# Patient Record
Sex: MALE | Race: WHITE | NOT HISPANIC OR LATINO | URBAN - METROPOLITAN AREA
[De-identification: names, ages, dates, MRNs, and addresses within clinical notes are randomized per-mention and may not be internally consistent; named-entity substitution may affect disease eponyms.]

---

## 2023-03-27 ENCOUNTER — NURSING HOME VISIT (OUTPATIENT)
Dept: POST ACUTE CARE | Facility: EXTERNAL LOCATION | Age: 88
End: 2023-03-27
Payer: MEDICARE

## 2023-03-27 DIAGNOSIS — N18.31 STAGE 3A CHRONIC KIDNEY DISEASE (MULTI): ICD-10-CM

## 2023-03-27 DIAGNOSIS — J18.9 PNEUMONIA OF LEFT LOWER LOBE DUE TO INFECTIOUS ORGANISM: Primary | ICD-10-CM

## 2023-03-27 DIAGNOSIS — I50.43 ACUTE ON CHRONIC COMBINED SYSTOLIC AND DIASTOLIC CONGESTIVE HEART FAILURE (MULTI): ICD-10-CM

## 2023-03-27 PROCEDURE — 99309 SBSQ NF CARE MODERATE MDM 30: CPT | Performed by: NURSE PRACTITIONER

## 2023-03-27 NOTE — LETTER
Patient: Peterson Argueta  : 1930    Encounter Date: 2023    Subjective  Patient ID: Peterson Argueta is a 89 y.o. male who presents for No chief complaint on file..  89 yo male admitted to [facility] from the hospital with weakness, recent fall, CHF, pneumonia, CKD, HTN, hyperlipidemia.          Review of Systems   Constitutional:  Positive for fatigue. Negative for chills and fever.   Respiratory:  Positive for cough, shortness of breath and wheezing.    Cardiovascular:  Positive for leg swelling. Negative for chest pain and palpitations.   Gastrointestinal:  Positive for nausea. Negative for abdominal pain, blood in stool, constipation, diarrhea and vomiting.        Denies heartburn   Neurological:  Positive for weakness. Negative for light-headedness.       Objective  Physical Exam  Constitutional:       Appearance: He is ill-appearing.   Cardiovascular:      Rate and Rhythm: Rhythm irregular.   Pulmonary:      Breath sounds: Wheezing and rhonchi present.      Comments: Resident reports sob on minimal activity.  Denies ever smoking.    Abdominal:      General: Bowel sounds are normal.   Musculoskeletal:      Right lower leg: Edema present.      Left lower leg: Edema present.   Neurological:      Mental Status: He is alert.   Psychiatric:         Mood and Affect: Mood normal.         No current outpatient medications on file.     Assessment/Plan  Problem List Items Addressed This Visit          Respiratory    Pneumonia of left lower lobe due to infectious organism - Primary       Circulatory    Acute on chronic combined systolic and diastolic congestive heart failure (CMS/HCC)       Genitourinary    Stage 3a chronic kidney disease   Resident will obtain labs tomorrow.  Pulse ox between 89-91% on room air at rest.  Will start him on 02 2L NC.  He is currently taking Guaifenesin prn only.  Will have him take Guaifenesin 600mg one tablet daily.  He is getting Duoneb q4hrs and discharged from the hospital with  Prednisone order.  Will get CXR today.  Continue to monitor.  PT/OT and dietician to eval.             Electronically Signed By: SANTA Cope   3/28/23 12:33 PM

## 2023-03-28 ENCOUNTER — NURSING HOME VISIT (OUTPATIENT)
Dept: POST ACUTE CARE | Facility: EXTERNAL LOCATION | Age: 88
End: 2023-03-28
Payer: MEDICARE

## 2023-03-28 DIAGNOSIS — N18.31 STAGE 3A CHRONIC KIDNEY DISEASE (MULTI): ICD-10-CM

## 2023-03-28 DIAGNOSIS — J18.9 PNEUMONIA OF RIGHT LOWER LOBE DUE TO INFECTIOUS ORGANISM: Primary | ICD-10-CM

## 2023-03-28 PROBLEM — I10 ESSENTIAL HYPERTENSION: Status: ACTIVE | Noted: 2023-03-28

## 2023-03-28 PROBLEM — I50.43 ACUTE ON CHRONIC COMBINED SYSTOLIC AND DIASTOLIC CONGESTIVE HEART FAILURE (MULTI): Status: ACTIVE | Noted: 2023-03-28

## 2023-03-28 PROBLEM — I48.21 ATRIAL FIBRILLATION, PERMANENT (MULTI): Status: ACTIVE | Noted: 2023-03-28

## 2023-03-28 PROBLEM — E78.5 HYPERLIPIDEMIA: Status: ACTIVE | Noted: 2023-03-28

## 2023-03-28 PROCEDURE — 99309 SBSQ NF CARE MODERATE MDM 30: CPT | Performed by: NURSE PRACTITIONER

## 2023-03-28 ASSESSMENT — ENCOUNTER SYMPTOMS
DIARRHEA: 0
PALPITATIONS: 0
SHORTNESS OF BREATH: 1
ABDOMINAL PAIN: 0
LIGHT-HEADEDNESS: 0
WEAKNESS: 1
ROS GI COMMENTS: DENIES HEARTBURN
WHEEZING: 1
PALPITATIONS: 0
GASTROINTESTINAL NEGATIVE: 1
SHORTNESS OF BREATH: 1
FEVER: 0
VOMITING: 0
COUGH: 1
WHEEZING: 1
NAUSEA: 1
BLOOD IN STOOL: 0
FATIGUE: 1
CHILLS: 0
CONSTIPATION: 0

## 2023-03-28 NOTE — PROGRESS NOTES
Subjective   Patient ID: Peterson Argueta is a 89 y.o. male who presents for No chief complaint on file..  89 yo male admitted to [facility] from the hospital with weakness, recent fall, CHF, pneumonia, CKD, HTN, hyperlipidemia.          Review of Systems   Constitutional:  Positive for fatigue. Negative for chills and fever.   Respiratory:  Positive for cough, shortness of breath and wheezing.    Cardiovascular:  Positive for leg swelling. Negative for chest pain and palpitations.   Gastrointestinal:  Positive for nausea. Negative for abdominal pain, blood in stool, constipation, diarrhea and vomiting.        Denies heartburn   Neurological:  Positive for weakness. Negative for light-headedness.       Objective   Physical Exam  Constitutional:       Appearance: He is ill-appearing.   Cardiovascular:      Rate and Rhythm: Rhythm irregular.   Pulmonary:      Breath sounds: Wheezing and rhonchi present.      Comments: Resident reports sob on minimal activity.  Denies ever smoking.    Abdominal:      General: Bowel sounds are normal.   Musculoskeletal:      Right lower leg: Edema present.      Left lower leg: Edema present.   Neurological:      Mental Status: He is alert.   Psychiatric:         Mood and Affect: Mood normal.         No current outpatient medications on file.     Assessment/Plan   Problem List Items Addressed This Visit          Respiratory    Pneumonia of left lower lobe due to infectious organism - Primary       Circulatory    Acute on chronic combined systolic and diastolic congestive heart failure (CMS/HCC)       Genitourinary    Stage 3a chronic kidney disease   Resident will obtain labs tomorrow.  Pulse ox between 89-91% on room air at rest.  Will start him on 02 2L NC.  He is currently taking Guaifenesin prn only.  Will have him take Guaifenesin 600mg one tablet daily.  He is getting Duoneb q4hrs and discharged from the hospital with Prednisone order.  Will get CXR today.  Continue to monitor.  PT/OT and  dietician to syed

## 2023-03-28 NOTE — PROGRESS NOTES
Subjective   Patient ID: Peterson Argueta is a 89 y.o. male who presents for No chief complaint on file..  89 yo male recent admission to [facility] with history of CHF, CKD, pneumonia and sleep apnea untreated.  Resident states today he is breathing somewhat better with the 02 but not much improvement.  Started on Doxycycline yesterday.  Continues to get Duoneb q4hrs.  Resident is not aware if he has ever been anemic and denies ever seeing blood in his stool.  Labs reviewed.          Review of Systems   Respiratory:  Positive for shortness of breath and wheezing.    Cardiovascular:  Positive for leg swelling. Negative for chest pain and palpitations.   Gastrointestinal: Negative.        Objective   Physical Exam  Constitutional:       Appearance: He is ill-appearing.   Cardiovascular:      Rate and Rhythm: Rhythm irregular.   Pulmonary:      Breath sounds: Wheezing present.   Abdominal:      General: Bowel sounds are normal.   Musculoskeletal:      Right lower leg: Edema present.      Left lower leg: Edema present.         No current outpatient medications on file.     Assessment/Plan   Problem List Items Addressed This Visit          Respiratory    Pneumonia of right lower lobe due to infectious organism - Primary       Genitourinary    Stage 3a chronic kidney disease   Resident was consulted while in the hospital by Dr. Greco nephrology.  Discussed kidney functions with Renetta HOSKINS from Dr. Castanon office.  Reviewed CXR and labs.    Will get additional labs and occult stool tomorrow.  Resident to be placed on a restricted fluid 1500ml per day.  Azael hose for edema.  Continue to monitor.

## 2023-03-29 ENCOUNTER — NURSING HOME VISIT (OUTPATIENT)
Dept: POST ACUTE CARE | Facility: EXTERNAL LOCATION | Age: 88
End: 2023-03-29
Payer: MEDICARE

## 2023-03-29 DIAGNOSIS — J18.9 PNEUMONIA OF RIGHT LOWER LOBE DUE TO INFECTIOUS ORGANISM: ICD-10-CM

## 2023-03-29 DIAGNOSIS — J18.9 PNEUMONIA OF LEFT LOWER LOBE DUE TO INFECTIOUS ORGANISM: Primary | ICD-10-CM

## 2023-03-29 PROCEDURE — 99309 SBSQ NF CARE MODERATE MDM 30: CPT | Performed by: NURSE PRACTITIONER

## 2023-03-29 NOTE — LETTER
Patient: Peterson Argueta  : 1930    Encounter Date: 2023    Subjective  Patient ID: Peterson Argueta is a 89 y.o. male who presents for No chief complaint on file..  89 yo male with history of pneumonia.  States he is feeling better today with less sob.          Review of Systems   Constitutional:  Negative for fever.   Respiratory:  Positive for cough, shortness of breath and wheezing.         Feels he cannot get phlegm up.   Cardiovascular: Negative.    Gastrointestinal: Negative.        Objective  Physical Exam  Constitutional:       Appearance: He is ill-appearing.   Cardiovascular:      Rate and Rhythm: Regular rhythm.   Pulmonary:      Breath sounds: Wheezing and rhonchi present.   Abdominal:      General: Bowel sounds are normal.   Musculoskeletal:      Right lower leg: Edema present.      Left lower leg: Edema present.         No current outpatient medications on file.     Assessment/Plan  Problem List Items Addressed This Visit          Respiratory    Pneumonia of left lower lobe due to infectious organism - Primary    Pneumonia of right lower lobe due to infectious organism     Will continue resident on Doxycycline 100mg bid.  Increase his Guaifenesin to 600mg bid.  Start him on Tessalon Perles at hs only as the cough is keeping him up at night.  Continue on 02 2L NC.  Will get sputum culture and resident to use Incentive Spirometry qid.  Continue to monitor.           Electronically Signed By: SANTA Cope   3/30/23 12:36 PM

## 2023-03-30 ENCOUNTER — NURSING HOME VISIT (OUTPATIENT)
Dept: POST ACUTE CARE | Facility: EXTERNAL LOCATION | Age: 88
End: 2023-03-30
Payer: MEDICARE

## 2023-03-30 DIAGNOSIS — J18.9 PNEUMONIA OF RIGHT LOWER LOBE DUE TO INFECTIOUS ORGANISM: ICD-10-CM

## 2023-03-30 DIAGNOSIS — J18.9 PNEUMONIA OF LEFT LOWER LOBE DUE TO INFECTIOUS ORGANISM: Primary | ICD-10-CM

## 2023-03-30 DIAGNOSIS — H61.23 BILATERAL IMPACTED CERUMEN: ICD-10-CM

## 2023-03-30 PROCEDURE — 99309 SBSQ NF CARE MODERATE MDM 30: CPT | Performed by: NURSE PRACTITIONER

## 2023-03-30 ASSESSMENT — ENCOUNTER SYMPTOMS
FEVER: 0
COUGH: 1
SHORTNESS OF BREATH: 1
GASTROINTESTINAL NEGATIVE: 1
GASTROINTESTINAL NEGATIVE: 1
WHEEZING: 1
PALPITATIONS: 0
COUGH: 1
FEVER: 0
WHEEZING: 1
SHORTNESS OF BREATH: 1
CARDIOVASCULAR NEGATIVE: 1

## 2023-03-30 NOTE — LETTER
Patient: Peterson Argueta  : 1930    Encounter Date: 2023    Subjective  Patient ID: Peterson Argueta is a 89 y.o. male who presents for No chief complaint on file..  Follow up on 89 yo male with history of pneumonia, CVA, A Fib, CHF, HTN, hyperlipidemia.  Resident states his sob is improved however, he still cannot cough up any phlegm.  He just received his incentive spirometry today.  Continues on 02 2L NC.  Family is concerned with his increased [place].          Review of Systems   Constitutional:  Negative for fever.   HENT:  Negative for ear discharge and ear pain.    Respiratory:  Positive for cough, shortness of breath and wheezing.    Cardiovascular:  Positive for leg swelling. Negative for chest pain and palpitations.   Gastrointestinal: Negative.        Objective  Physical Exam  Constitutional:       Appearance: He is ill-appearing.   HENT:      Right Ear: There is impacted cerumen.      Left Ear: There is impacted cerumen.   Cardiovascular:      Rate and Rhythm: Rhythm irregular.   Pulmonary:      Effort: Pulmonary effort is normal.      Breath sounds: Wheezing and rhonchi present.   Abdominal:      General: Bowel sounds are normal.   Musculoskeletal:      Right lower leg: Edema present.      Left lower leg: Edema present.   Neurological:      Mental Status: He is alert.         No current outpatient medications on file.     Assessment/Plan  Problem List Items Addressed This Visit          Respiratory    Pneumonia of left lower lobe due to infectious organism - Primary    Pneumonia of right lower lobe due to infectious organism       Other    Bilateral impacted cerumen     Continue same regimen.  Get Azael hose on lower extremities.    Start Debrox for cerumen impaction, 5 gtts tid x 5 days.  Will assess need for irrigation after treatment.           Electronically Signed By: DANIELA Coep-CNP   3/30/23  1:17 PM

## 2023-03-30 NOTE — PROGRESS NOTES
Subjective   Patient ID: Peterson Argueta is a 89 y.o. male who presents for No chief complaint on file..  Follow up on 89 yo male with history of pneumonia, CVA, A Fib, CHF, HTN, hyperlipidemia.  Resident states his sob is improved however, he still cannot cough up any phlegm.  He just received his incentive spirometry today.  Continues on 02 2L NC.  Family is concerned with his increased [place].          Review of Systems   Constitutional:  Negative for fever.   HENT:  Negative for ear discharge and ear pain.    Respiratory:  Positive for cough, shortness of breath and wheezing.    Cardiovascular:  Positive for leg swelling. Negative for chest pain and palpitations.   Gastrointestinal: Negative.        Objective   Physical Exam  Constitutional:       Appearance: He is ill-appearing.   HENT:      Right Ear: There is impacted cerumen.      Left Ear: There is impacted cerumen.   Cardiovascular:      Rate and Rhythm: Rhythm irregular.   Pulmonary:      Effort: Pulmonary effort is normal.      Breath sounds: Wheezing and rhonchi present.   Abdominal:      General: Bowel sounds are normal.   Musculoskeletal:      Right lower leg: Edema present.      Left lower leg: Edema present.   Neurological:      Mental Status: He is alert.         No current outpatient medications on file.     Assessment/Plan   Problem List Items Addressed This Visit          Respiratory    Pneumonia of left lower lobe due to infectious organism - Primary    Pneumonia of right lower lobe due to infectious organism       Other    Bilateral impacted cerumen     Continue same regimen.  Get Azael hose on lower extremities.    Start Debrox for cerumen impaction, 5 gtts tid x 5 days.  Will assess need for irrigation after treatment.

## 2023-03-30 NOTE — PROGRESS NOTES
Subjective   Patient ID: Peterson Argueta is a 89 y.o. male who presents for No chief complaint on file..  89 yo male with history of pneumonia.  States he is feeling better today with less sob.          Review of Systems   Constitutional:  Negative for fever.   Respiratory:  Positive for cough, shortness of breath and wheezing.         Feels he cannot get phlegm up.   Cardiovascular: Negative.    Gastrointestinal: Negative.        Objective   Physical Exam  Constitutional:       Appearance: He is ill-appearing.   Cardiovascular:      Rate and Rhythm: Regular rhythm.   Pulmonary:      Breath sounds: Wheezing and rhonchi present.   Abdominal:      General: Bowel sounds are normal.   Musculoskeletal:      Right lower leg: Edema present.      Left lower leg: Edema present.         No current outpatient medications on file.     Assessment/Plan   Problem List Items Addressed This Visit          Respiratory    Pneumonia of left lower lobe due to infectious organism - Primary    Pneumonia of right lower lobe due to infectious organism     Will continue resident on Doxycycline 100mg bid.  Increase his Guaifenesin to 600mg bid.  Start him on Tessalon Perles at hs only as the cough is keeping him up at night.  Continue on 02 2L NC.  Will get sputum culture and resident to use Incentive Spirometry qid.  Continue to monitor.

## 2023-03-31 ENCOUNTER — NURSING HOME VISIT (OUTPATIENT)
Dept: POST ACUTE CARE | Facility: EXTERNAL LOCATION | Age: 88
End: 2023-03-31
Payer: MEDICARE

## 2023-03-31 DIAGNOSIS — I50.42 CHRONIC COMBINED SYSTOLIC AND DIASTOLIC CONGESTIVE HEART FAILURE (MULTI): ICD-10-CM

## 2023-03-31 DIAGNOSIS — I10 ESSENTIAL HYPERTENSION: ICD-10-CM

## 2023-03-31 DIAGNOSIS — N18.31 STAGE 3A CHRONIC KIDNEY DISEASE (MULTI): ICD-10-CM

## 2023-03-31 DIAGNOSIS — R60.0 BILATERAL LOWER EXTREMITY EDEMA: Primary | ICD-10-CM

## 2023-03-31 PROCEDURE — 99304 1ST NF CARE SF/LOW MDM 25: CPT | Performed by: INTERNAL MEDICINE

## 2023-03-31 NOTE — LETTER
Patient: Peterson Argueta  : 1930    Encounter Date: 2023    89 YO MALE WITH PMH HTN CHF CKD A FIB HERE FOR REHAB FEELS FINE TDAY  Pt is doing fine , no complaint  GA: Comfortable, no distress  ROS: No SOB  Medications reviewed  Head: Normal  Neck: Soft  Heart: Regular  Lungs: Clear  Abdomen: soft  EXT MILD TO MODERATE LE EDEMA    Impression: clinically doing fine, continue current management    Problem List Items Addressed This Visit          Circulatory    Essential hypertension       Genitourinary    Stage 3a chronic kidney disease       Musculoskeletal    Bilateral lower extremity edema - Primary     Other Visit Diagnoses       Chronic combined systolic and diastolic congestive heart failure (CMS/HCC)                   Electronically Signed By: Eleazar Perkins MD   3/31/23  7:00 PM

## 2023-03-31 NOTE — PROGRESS NOTES
89 YO MALE WITH PMH HTN CHF CKD A FIB HERE FOR REHAB FEELS FINE TDAY  Pt is doing fine , no complaint  GA: Comfortable, no distress  ROS: No SOB  Medications reviewed  Head: Normal  Neck: Soft  Heart: Regular  Lungs: Clear  Abdomen: soft  EXT MILD TO MODERATE LE EDEMA    Impression: clinically doing fine, continue current management    Problem List Items Addressed This Visit          Circulatory    Essential hypertension       Genitourinary    Stage 3a chronic kidney disease       Musculoskeletal    Bilateral lower extremity edema - Primary     Other Visit Diagnoses       Chronic combined systolic and diastolic congestive heart failure (CMS/HCC)

## 2023-04-03 ENCOUNTER — NURSING HOME VISIT (OUTPATIENT)
Dept: POST ACUTE CARE | Facility: EXTERNAL LOCATION | Age: 88
End: 2023-04-03
Payer: MEDICARE

## 2023-04-03 DIAGNOSIS — R60.0 BILATERAL LOWER EXTREMITY EDEMA: Primary | ICD-10-CM

## 2023-04-03 DIAGNOSIS — N18.31 STAGE 3A CHRONIC KIDNEY DISEASE (MULTI): ICD-10-CM

## 2023-04-03 DIAGNOSIS — I50.43 ACUTE ON CHRONIC COMBINED SYSTOLIC AND DIASTOLIC CONGESTIVE HEART FAILURE (MULTI): ICD-10-CM

## 2023-04-03 DIAGNOSIS — R19.5 POSITIVE OCCULT STOOL BLOOD TEST: ICD-10-CM

## 2023-04-03 PROCEDURE — 99308 SBSQ NF CARE LOW MDM 20: CPT | Performed by: NURSE PRACTITIONER

## 2023-04-03 NOTE — LETTER
Patient: Peterson Argueta  : 1930    Encounter Date: 2023    Subjective  Patient ID: Peterson Argueta is a 89 y.o. male who presents for No chief complaint on file..  89 yo male on rehab unit with history of CHF, CKD and recently tested positive occult stool.  Resident was seen by Dr. Perkins on Friday; EGD and colonoscopy is scheduled.  Sputum culture has been completed and no results yet.  Resident states he is feeling about the same as last week.  Additional labs ordered.          Review of Systems   Constitutional:  Negative for chills and fever.   Respiratory:  Positive for shortness of breath and wheezing.    Cardiovascular:  Positive for leg swelling.   Gastrointestinal: Negative.        Objective  Physical Exam  Constitutional:       General: He is not in acute distress.     Appearance: He is ill-appearing.   Cardiovascular:      Rate and Rhythm: Regular rhythm.   Pulmonary:      Comments: Slight rales heard on expiration posteriorly.  Rhonchi improved from previous exam anteriorly and posteriorly.    Abdominal:      General: Bowel sounds are normal.   Musculoskeletal:      Right lower leg: Edema present.      Left lower leg: Edema present.         No current outpatient medications on file.     Assessment/Plan  Problem List Items Addressed This Visit          Circulatory    Acute on chronic combined systolic and diastolic congestive heart failure (CMS/HCC)       Genitourinary    Stage 3a chronic kidney disease       Musculoskeletal    Bilateral lower extremity edema - Primary       Other    Positive occult stool blood test   Awaiting labs results.  Apply ace wraps on bilateral lower extremities.  EGD and colonoscopy to be scheduled.             Electronically Signed By: SANTA Cope   23 11:40 AM

## 2023-04-04 ENCOUNTER — NURSING HOME VISIT (OUTPATIENT)
Dept: POST ACUTE CARE | Facility: EXTERNAL LOCATION | Age: 88
End: 2023-04-04
Payer: MEDICARE

## 2023-04-04 ENCOUNTER — TELEPHONE (OUTPATIENT)
Dept: PRIMARY CARE | Facility: CLINIC | Age: 88
End: 2023-04-04

## 2023-04-04 DIAGNOSIS — N18.31 STAGE 3A CHRONIC KIDNEY DISEASE (MULTI): ICD-10-CM

## 2023-04-04 DIAGNOSIS — R60.0 BILATERAL LOWER EXTREMITY EDEMA: ICD-10-CM

## 2023-04-04 DIAGNOSIS — I50.43 ACUTE ON CHRONIC COMBINED SYSTOLIC AND DIASTOLIC CONGESTIVE HEART FAILURE (MULTI): Primary | ICD-10-CM

## 2023-04-04 DIAGNOSIS — R19.5 POSITIVE OCCULT STOOL BLOOD TEST: ICD-10-CM

## 2023-04-04 PROCEDURE — 99309 SBSQ NF CARE MODERATE MDM 30: CPT | Performed by: NURSE PRACTITIONER

## 2023-04-04 ASSESSMENT — ENCOUNTER SYMPTOMS
COUGH: 1
APPETITE CHANGE: 1
CHILLS: 0
CHILLS: 0
SHORTNESS OF BREATH: 1
GASTROINTESTINAL NEGATIVE: 1
PALPITATIONS: 0
WHEEZING: 1
FEVER: 0
GASTROINTESTINAL NEGATIVE: 1
SHORTNESS OF BREATH: 1
FEVER: 0

## 2023-04-04 NOTE — PROGRESS NOTES
Subjective   Patient ID: Peterson Argueta is a 89 y.o. male who presents for No chief complaint on file..  89 yo male on rehab at [facility] with history of CHF, CKD, recent positive occult stool.  Labs reviewed.  Resident states he is feeling the same.  Denies cp. Admits he does have sob on minimal activity and physical therapy.          Review of Systems   Constitutional:  Positive for appetite change. Negative for chills and fever.   Respiratory:  Positive for cough and shortness of breath.    Cardiovascular:  Positive for leg swelling. Negative for chest pain and palpitations.   Gastrointestinal: Negative.        Objective   Physical Exam  Cardiovascular:      Rate and Rhythm: Regular rhythm.   Pulmonary:      Effort: Pulmonary effort is normal.      Comments: Minimal Fine crackles noted in bess lower bases posteriorly.  No wheezing or rhonchi noted.    Abdominal:      General: Bowel sounds are normal.   Musculoskeletal:      Right lower leg: Edema present.      Left lower leg: Edema present.   Neurological:      Mental Status: He is alert.         No current outpatient medications on file.     Assessment/Plan   Problem List Items Addressed This Visit          Circulatory    Acute on chronic combined systolic and diastolic congestive heart failure (CMS/HCC) - Primary       Genitourinary    Stage 3a chronic kidney disease       Musculoskeletal    Bilateral lower extremity edema       Other    Positive occult stool blood test   CHF/lower leg edema:  Noted BNP above 13,000 as compared to 885 in hospital.  Currently on Bumex 1mg bid and Spirolactone.  Please apply ace wraps daily, on in am off in pm.   Consult with cardiology by Renetta HOSKINS from Dr. Castanon office.    CKD Stage III;  BUN up to 94, Cr. 1.75, GFR 36.  K+ 5.0 and Na+ 136.  Will stop Spirolactone.   Get bladder scan.  May need renal ultrasound.  Positive occult stool;  Discussed EGD and colonoscopy with family at their request.  Family and patient have decided to  cancel order for EGD and colonoscopy.

## 2023-04-04 NOTE — PROGRESS NOTES
Subjective   Patient ID: Peterson Argueta is a 89 y.o. male who presents for No chief complaint on file..  89 yo male on rehab unit with history of CHF, CKD and recently tested positive occult stool.  Resident was seen by Dr. Perkins on Friday; EGD and colonoscopy is scheduled.  Sputum culture has been completed and no results yet.  Resident states he is feeling about the same as last week.  Additional labs ordered.          Review of Systems   Constitutional:  Negative for chills and fever.   Respiratory:  Positive for shortness of breath and wheezing.    Cardiovascular:  Positive for leg swelling.   Gastrointestinal: Negative.        Objective   Physical Exam  Constitutional:       General: He is not in acute distress.     Appearance: He is ill-appearing.   Cardiovascular:      Rate and Rhythm: Regular rhythm.   Pulmonary:      Comments: Slight rales heard on expiration posteriorly.  Rhonchi improved from previous exam anteriorly and posteriorly.    Abdominal:      General: Bowel sounds are normal.   Musculoskeletal:      Right lower leg: Edema present.      Left lower leg: Edema present.         No current outpatient medications on file.     Assessment/Plan   Problem List Items Addressed This Visit          Circulatory    Acute on chronic combined systolic and diastolic congestive heart failure (CMS/HCC)       Genitourinary    Stage 3a chronic kidney disease       Musculoskeletal    Bilateral lower extremity edema - Primary       Other    Positive occult stool blood test   Awaiting labs results.  Apply ace wraps on bilateral lower extremities.  EGD and colonoscopy to be scheduled.

## 2023-04-04 NOTE — LETTER
Patient: Peterson Argueta  : 1930    Encounter Date: 2023    Subjective  Patient ID: Peterson Argueta is a 89 y.o. male who presents for No chief complaint on file..  89 yo male on rehab at [facility] with history of CHF, CKD, recent positive occult stool.  Labs reviewed.  Resident states he is feeling the same.  Denies cp. Admits he does have sob on minimal activity and physical therapy.          Review of Systems   Constitutional:  Positive for appetite change. Negative for chills and fever.   Respiratory:  Positive for cough and shortness of breath.    Cardiovascular:  Positive for leg swelling. Negative for chest pain and palpitations.   Gastrointestinal: Negative.        Objective  Physical Exam  Cardiovascular:      Rate and Rhythm: Regular rhythm.   Pulmonary:      Effort: Pulmonary effort is normal.      Comments: Minimal Fine crackles noted in bess lower bases posteriorly.  No wheezing or rhonchi noted.    Abdominal:      General: Bowel sounds are normal.   Musculoskeletal:      Right lower leg: Edema present.      Left lower leg: Edema present.   Neurological:      Mental Status: He is alert.         No current outpatient medications on file.     Assessment/Plan  Problem List Items Addressed This Visit          Circulatory    Acute on chronic combined systolic and diastolic congestive heart failure (CMS/HCC) - Primary       Genitourinary    Stage 3a chronic kidney disease       Musculoskeletal    Bilateral lower extremity edema       Other    Positive occult stool blood test   CHF/lower leg edema:  Noted BNP above 13,000 as compared to 885 in hospital.  Currently on Bumex 1mg bid and Spirolactone.  Please apply ace wraps daily, on in am off in pm.   Consult with cardiology by Renetta HOSKINS from Dr. Castanon office.    CKD Stage III;  BUN up to 94, Cr. 1.75, GFR 36.  K+ 5.0 and Na+ 136.  Will stop Spirolactone.   Get bladder scan.  May need renal ultrasound.  Positive occult stool;  Discussed EGD and  colonoscopy with family at their request.  Family and patient have decided to cancel order for EGD and colonoscopy.             Electronically Signed By: SANTA Cope   4/4/23 11:50 AM

## 2023-04-04 NOTE — TELEPHONE ENCOUNTER
[facility] CALLED AND STATED THEY WERE SCHEDULING THIS PATIENT FOR COLON/EGD I DON'T SEE IT ON [facility]  [facility] IS DECLINING SERVICES

## 2023-04-05 ENCOUNTER — NURSING HOME VISIT (OUTPATIENT)
Dept: POST ACUTE CARE | Facility: EXTERNAL LOCATION | Age: 88
End: 2023-04-05
Payer: MEDICARE

## 2023-04-05 DIAGNOSIS — I50.43 ACUTE ON CHRONIC COMBINED SYSTOLIC AND DIASTOLIC CONGESTIVE HEART FAILURE (MULTI): Primary | ICD-10-CM

## 2023-04-05 DIAGNOSIS — N18.31 STAGE 3A CHRONIC KIDNEY DISEASE (MULTI): ICD-10-CM

## 2023-04-05 DIAGNOSIS — R19.5 POSITIVE OCCULT STOOL BLOOD TEST: ICD-10-CM

## 2023-04-05 DIAGNOSIS — R04.2 COUGH WITH HEMOPTYSIS: ICD-10-CM

## 2023-04-05 PROCEDURE — 99309 SBSQ NF CARE MODERATE MDM 30: CPT | Performed by: NURSE PRACTITIONER

## 2023-04-05 NOTE — LETTER
"Patient: Peterson Argueta  : 1930    Encounter Date: 2023    Subjective  Patient ID: Peterson Argueta is a 89 y.o. male who presents for No chief complaint on file..  89 yo male with history of pneumonia, CHF, CKD, recent positive occult stool on rehab at [facility].  Resident continues to have a slight dry cough.   CNP was made aware resident coughed up blood this am.  Resident also admits, he is visualizing blood in his stool.  EGD and Colonoscopy has already been ordered by Dr. Perkins yesterday.  Residents son and wife are present in room verbalizing concerns with putting resident through EGD/colonoscopy and have decided they are going to refuse EGD/colonoscopy due to his poor condition and other co-morbidities.          Review of Systems   Constitutional:  Positive for appetite change and fatigue. Negative for chills and fever.        Resident states nothing tastes good, has a \"funny\" taste in his mouth and he has lack of appetite.     Respiratory:  Positive for cough and shortness of breath.    Cardiovascular:  Positive for leg swelling. Negative for chest pain and palpitations.   Gastrointestinal:  Positive for blood in stool and rectal pain. Negative for abdominal pain, constipation, diarrhea, nausea and vomiting.        Admits he started having rectal pain yesterday.     Endocrine: Negative.    Genitourinary:  Negative for difficulty urinating and hematuria.   Neurological:  Positive for weakness. Negative for dizziness and syncope.   Psychiatric/Behavioral: Negative.         Objective  Physical Exam  Constitutional:       General: He is not in acute distress.     Appearance: He is ill-appearing.   HENT:      Mouth/Throat:      Comments: Noted dry blood posterior pharynx.    Cardiovascular:      Rate and Rhythm: Regular rhythm.   Pulmonary:      Comments: Lungs diminished in lower posterior bases.  Coughs on forced exhalation.  No rales or rhonchi heard.    Abdominal:      General: Bowel sounds are " normal.      Palpations: There is no mass.      Tenderness: There is abdominal tenderness. There is no guarding.      Comments: There is some slight tenderness noted to right lower quadrant on palpation.     Musculoskeletal:      Right lower leg: Edema present.      Left lower leg: Edema present.   Skin:     General: Skin is warm and dry.      Capillary Refill: Capillary refill takes 2 to 3 seconds.   Neurological:      Mental Status: He is alert.   Psychiatric:         Mood and Affect: Mood normal.         No current outpatient medications on file.     Assessment/Plan  Problem List Items Addressed This Visit          Respiratory    Cough with hemoptysis       Circulatory    Acute on chronic combined systolic and diastolic congestive heart failure (CMS/HCC) - Primary       Genitourinary    Stage 3a chronic kidney disease       Other    Positive occult stool blood test   Cough with hemoptysis;  Will get CT chest without contrast due to CKD to r/o lung CA.  Get CEA level.    CHF;  Ace wrap lower extremities.  Will continue same regimen of medications at this time.  On fluid restriction.   CKD:  Will continue same medications at this time.  Spirolactone discontinued.  Positive occult stool; family has decided to refuse EGD and colonoscopy.  Will RX Norco for rectal pain.    Long discussion with both son's,  wife, and resident.  Will obtain CT chest to r/o lung ca due to hemoptysis.  Family not willing to put resident through EGD/colonoscopy at this time.  Family requesting comfort measures at this time and resident is agreeable.  Resident and family verbalize understanding of requested measures.  Will refer to Hospice for comfort measures.  Discussed DNRCC, living will, and burial requests with family and resident.             Electronically Signed By: SANTA Cope   4/8/23  8:49 AM

## 2023-04-08 PROBLEM — R04.2 COUGH WITH HEMOPTYSIS: Status: ACTIVE | Noted: 2023-04-08

## 2023-04-08 ASSESSMENT — ENCOUNTER SYMPTOMS
CONSTIPATION: 0
DIZZINESS: 0
WEAKNESS: 1
RECTAL PAIN: 1
PSYCHIATRIC NEGATIVE: 1
FEVER: 0
FATIGUE: 1
BLOOD IN STOOL: 1
ABDOMINAL PAIN: 0
HEMATURIA: 0
APPETITE CHANGE: 1
SHORTNESS OF BREATH: 1
VOMITING: 0
ENDOCRINE NEGATIVE: 1
DIFFICULTY URINATING: 0
PALPITATIONS: 0
NAUSEA: 0
DIARRHEA: 0
CHILLS: 0
COUGH: 1

## 2023-04-08 NOTE — PROGRESS NOTES
"Subjective   Patient ID: Peterson Argueta is a 89 y.o. male who presents for No chief complaint on file..  89 yo male with history of pneumonia, CHF, CKD, recent positive occult stool on rehab [facility].  Resident continues to have a slight dry cough.   CNP was made aware resident coughed up blood this am.  Resident also admits, he is visualizing blood in his stool.  EGD and Colonoscopy has already been ordered by Dr. Perkins yesterday.  Residents son and wife are present in room verbalizing concerns with putting resident through EGD/colonoscopy and have decided they are going to refuse EGD/colonoscopy due to his poor condition and other co-morbidities.          Review of Systems   Constitutional:  Positive for appetite change and fatigue. Negative for chills and fever.        Resident states nothing tastes good, has a \"funny\" taste in his mouth and he has lack of appetite.     Respiratory:  Positive for cough and shortness of breath.    Cardiovascular:  Positive for leg swelling. Negative for chest pain and palpitations.   Gastrointestinal:  Positive for blood in stool and rectal pain. Negative for abdominal pain, constipation, diarrhea, nausea and vomiting.        Admits he started having rectal pain yesterday.     Endocrine: Negative.    Genitourinary:  Negative for difficulty urinating and hematuria.   Neurological:  Positive for weakness. Negative for dizziness and syncope.   Psychiatric/Behavioral: Negative.         Objective   Physical Exam  Constitutional:       General: He is not in acute distress.     Appearance: He is ill-appearing.   HENT:      Mouth/Throat:      Comments: Noted dry blood posterior pharynx.    Cardiovascular:      Rate and Rhythm: Regular rhythm.   Pulmonary:      Comments: Lungs diminished in lower posterior bases.  Coughs on forced exhalation.  No rales or rhonchi heard.    Abdominal:      General: Bowel sounds are normal.      Palpations: There is no mass.      Tenderness: There is " abdominal tenderness. There is no guarding.      Comments: There is some slight tenderness noted to right lower quadrant on palpation.     Musculoskeletal:      Right lower leg: Edema present.      Left lower leg: Edema present.   Skin:     General: Skin is warm and dry.      Capillary Refill: Capillary refill takes 2 to 3 seconds.   Neurological:      Mental Status: He is alert.   Psychiatric:         Mood and Affect: Mood normal.         No current outpatient medications on file.     Assessment/Plan   Problem List Items Addressed This Visit          Respiratory    Cough with hemoptysis       Circulatory    Acute on chronic combined systolic and diastolic congestive heart failure (CMS/HCC) - Primary       Genitourinary    Stage 3a chronic kidney disease       Other    Positive occult stool blood test   Cough with hemoptysis;  Will get CT chest without contrast due to CKD to r/o lung CA.  Get CEA level.    CHF;  Ace wrap lower extremities.  Will continue same regimen of medications at this time.  On fluid restriction.   CKD:  Will continue same medications at this time.  Spirolactone discontinued.  Positive occult stool; family has decided to refuse EGD and colonoscopy.  Will RX Norco for rectal pain.    Long discussion with both son's,  wife, and resident.  Will obtain CT chest to r/o lung ca due to hemoptysis.  Family not willing to put resident through EGD/colonoscopy at this time.  Family requesting comfort measures at this time and resident is agreeable.  Resident and family verbalize understanding of requested measures.  Will refer to Hospice for comfort measures.  Discussed DNRCC, living will, and burial requests with family and resident.

## 2023-04-10 ENCOUNTER — NURSING HOME VISIT (OUTPATIENT)
Dept: POST ACUTE CARE | Facility: EXTERNAL LOCATION | Age: 88
End: 2023-04-10
Payer: MEDICARE

## 2023-04-10 DIAGNOSIS — N18.31 STAGE 3A CHRONIC KIDNEY DISEASE (MULTI): ICD-10-CM

## 2023-04-10 DIAGNOSIS — R60.0 BILATERAL LOWER EXTREMITY EDEMA: ICD-10-CM

## 2023-04-10 DIAGNOSIS — I50.43 ACUTE ON CHRONIC COMBINED SYSTOLIC AND DIASTOLIC CONGESTIVE HEART FAILURE (MULTI): ICD-10-CM

## 2023-04-10 DIAGNOSIS — J18.9 PNEUMONIA OF LEFT LOWER LOBE DUE TO INFECTIOUS ORGANISM: Primary | ICD-10-CM

## 2023-04-10 PROCEDURE — 99309 SBSQ NF CARE MODERATE MDM 30: CPT | Performed by: NURSE PRACTITIONER

## 2023-04-10 ASSESSMENT — ENCOUNTER SYMPTOMS
SHORTNESS OF BREATH: 0
FEVER: 0
CONSTIPATION: 0
WEAKNESS: 1
VOMITING: 0
DIARRHEA: 0
RECTAL PAIN: 0
PALPITATIONS: 0
WHEEZING: 0
COUGH: 1
ABDOMINAL PAIN: 0
NAUSEA: 0
CHILLS: 0
BLOOD IN STOOL: 0

## 2023-04-10 NOTE — PROGRESS NOTES
Subjective   Patient ID: Peterson Argueta is a 89 y.o. male who presents for No chief complaint on file..  Follow up on 89 yo male with history of CHF, pneumonia, CKD.  Resident feels breathing tx successful in his sob.  Continues to cough.  CT scan of chest, bladder scan reviewed with resident and family.  Resident denies any pain today.         Review of Systems   Constitutional:  Negative for chills and fever.   Respiratory:  Positive for cough. Negative for shortness of breath and wheezing.    Cardiovascular:  Positive for leg swelling. Negative for chest pain and palpitations.   Gastrointestinal:  Negative for abdominal pain, blood in stool, constipation, diarrhea, nausea, rectal pain and vomiting.   Neurological:  Positive for weakness.       Objective   Physical Exam  Constitutional:       General: He is not in acute distress.     Appearance: He is ill-appearing.   Cardiovascular:      Rate and Rhythm: Regular rhythm.   Pulmonary:      Effort: Pulmonary effort is normal.      Comments: Lungs diminished lower posterior bases bilaterally  Abdominal:      General: There is no distension.      Palpations: Abdomen is soft.      Tenderness: There is no abdominal tenderness. There is no guarding.   Musculoskeletal:      Right lower leg: Edema present.      Left lower leg: Edema present.      Comments: Ace wraps in place, edema improved from previous exam.    Skin:     General: Skin is warm and dry.      Coloration: Skin is pale.   Neurological:      Mental Status: He is alert.         No current outpatient medications on file.     Assessment/Plan   Problem List Items Addressed This Visit          Respiratory    Pneumonia of left lower lobe due to infectious organism - Primary       Circulatory    Acute on chronic combined systolic and diastolic congestive heart failure (CMS/HCC)       Genitourinary    Stage 3a chronic kidney disease       Musculoskeletal    Bilateral lower extremity edema   CT chest reviewed.  Will  start him on Azithromax.  Continue other current regimen.  Get renal ultrasound; bladder scan unremarkable.    Continue to monitor.

## 2023-04-10 NOTE — LETTER
Patient: Peterson Argueta  : 1930    Encounter Date: 04/10/2023    Subjective  Patient ID: Peterson Argueta is a 89 y.o. male who presents for No chief complaint on file..  Follow up on 89 yo male with history of CHF, pneumonia, CKD.  Resident feels breathing tx successful in his sob.  Continues to cough.  CT scan of chest, bladder scan reviewed with resident and family.  Resident denies any pain today.         Review of Systems   Constitutional:  Negative for chills and fever.   Respiratory:  Positive for cough. Negative for shortness of breath and wheezing.    Cardiovascular:  Positive for leg swelling. Negative for chest pain and palpitations.   Gastrointestinal:  Negative for abdominal pain, blood in stool, constipation, diarrhea, nausea, rectal pain and vomiting.   Neurological:  Positive for weakness.       Objective  Physical Exam  Constitutional:       General: He is not in acute distress.     Appearance: He is ill-appearing.   Cardiovascular:      Rate and Rhythm: Regular rhythm.   Pulmonary:      Effort: Pulmonary effort is normal.      Comments: Lungs diminished lower posterior bases bilaterally  Abdominal:      General: There is no distension.      Palpations: Abdomen is soft.      Tenderness: There is no abdominal tenderness. There is no guarding.   Musculoskeletal:      Right lower leg: Edema present.      Left lower leg: Edema present.      Comments: Ace wraps in place, edema improved from previous exam.    Skin:     General: Skin is warm and dry.      Coloration: Skin is pale.   Neurological:      Mental Status: He is alert.         No current outpatient medications on file.     Assessment/Plan  Problem List Items Addressed This Visit          Respiratory    Pneumonia of left lower lobe due to infectious organism - Primary       Circulatory    Acute on chronic combined systolic and diastolic congestive heart failure (CMS/HCC)       Genitourinary    Stage 3a chronic kidney disease        Musculoskeletal    Bilateral lower extremity edema   CT chest reviewed.  Will start him on Azithromax.  Continue other current regimen.  Get renal ultrasound; bladder scan unremarkable.    Continue to monitor.             Electronically Signed By: SANTA Cope   4/10/23  2:07 PM

## 2023-04-11 ENCOUNTER — NURSING HOME VISIT (OUTPATIENT)
Dept: POST ACUTE CARE | Facility: EXTERNAL LOCATION | Age: 88
End: 2023-04-11
Payer: MEDICARE

## 2023-04-11 DIAGNOSIS — I50.43 ACUTE ON CHRONIC COMBINED SYSTOLIC AND DIASTOLIC CONGESTIVE HEART FAILURE (MULTI): ICD-10-CM

## 2023-04-11 DIAGNOSIS — N18.31 STAGE 3A CHRONIC KIDNEY DISEASE (MULTI): ICD-10-CM

## 2023-04-11 DIAGNOSIS — J18.9 PNEUMONIA OF LEFT LOWER LOBE DUE TO INFECTIOUS ORGANISM: Primary | ICD-10-CM

## 2023-04-11 DIAGNOSIS — R60.0 BILATERAL LOWER EXTREMITY EDEMA: ICD-10-CM

## 2023-04-11 DIAGNOSIS — R19.5 POSITIVE OCCULT STOOL BLOOD TEST: ICD-10-CM

## 2023-04-11 PROCEDURE — 99308 SBSQ NF CARE LOW MDM 20: CPT | Performed by: NURSE PRACTITIONER

## 2023-04-11 NOTE — LETTER
Patient: Peterson Argueta  : 1930    Encounter Date: 2023    Subjective  Patient ID: Peterson Argueta is a 89 y.o. male who presents for No chief complaint on file..  89 yo male with history of HTN, ASHD, CHF, A fib., CKD, CVA, pneumonia, positive occult stool.  Discussed again with family the residents condition and family will proceed with Hospice.  Will discontinue all previously discussed orders.          Review of Systems   Constitutional:  Positive for appetite change and fatigue. Negative for chills and fever.   Respiratory:  Positive for cough, shortness of breath and wheezing.    Cardiovascular:  Positive for leg swelling. Negative for chest pain and palpitations.   Gastrointestinal:  Positive for blood in stool. Negative for abdominal pain, constipation, diarrhea, nausea and vomiting.   Neurological:  Positive for weakness.       Objective  Physical Exam  Constitutional:       General: He is not in acute distress.     Appearance: He is ill-appearing.   Cardiovascular:      Rate and Rhythm: Normal rate. Rhythm irregular.   Pulmonary:      Effort: Pulmonary effort is normal.      Breath sounds: Rhonchi present.      Comments:  CT shows pneumonia left lobe  Neurological:      Mental Status: He is alert.         No current outpatient medications on file.     Assessment/Plan  Problem List Items Addressed This Visit          Respiratory    Pneumonia of left lower lobe due to infectious organism - Primary       Circulatory    Acute on chronic combined systolic and diastolic congestive heart failure (CMS/HCC)       Genitourinary    Stage 3a chronic kidney disease       Musculoskeletal    Bilateral lower extremity edema       Other    Positive occult stool blood test   Currently on ATB for pneumonia.  Occult stool positive.  Family agreeable to hospice.  Order already written for referral.  Discontinue any further testing.             Electronically Signed By: SANTA Cope   23  3:03  PM

## 2023-04-12 ASSESSMENT — ENCOUNTER SYMPTOMS
CHILLS: 0
SHORTNESS OF BREATH: 1
COUGH: 1
APPETITE CHANGE: 1
FATIGUE: 1
VOMITING: 0
CONSTIPATION: 0
BLOOD IN STOOL: 1
PALPITATIONS: 0
FEVER: 0
ABDOMINAL PAIN: 0
NAUSEA: 0
WEAKNESS: 1
WHEEZING: 1
DIARRHEA: 0

## 2023-04-12 NOTE — PROGRESS NOTES
Subjective   Patient ID: Peterson Argueta is a 89 y.o. male who presents for No chief complaint on file..  89 yo male with history of HTN, ASHD, CHF, A fib., CKD, CVA, pneumonia, positive occult stool.  Discussed again with family the residents condition and family will proceed with Hospice.  Will discontinue all previously discussed orders.          Review of Systems   Constitutional:  Positive for appetite change and fatigue. Negative for chills and fever.   Respiratory:  Positive for cough, shortness of breath and wheezing.    Cardiovascular:  Positive for leg swelling. Negative for chest pain and palpitations.   Gastrointestinal:  Positive for blood in stool. Negative for abdominal pain, constipation, diarrhea, nausea and vomiting.   Neurological:  Positive for weakness.       Objective   Physical Exam  Constitutional:       General: He is not in acute distress.     Appearance: He is ill-appearing.   Cardiovascular:      Rate and Rhythm: Normal rate. Rhythm irregular.   Pulmonary:      Effort: Pulmonary effort is normal.      Breath sounds: Rhonchi present.      Comments:  CT shows pneumonia left lobe  Neurological:      Mental Status: He is alert.         No current outpatient medications on file.     Assessment/Plan   Problem List Items Addressed This Visit          Respiratory    Pneumonia of left lower lobe due to infectious organism - Primary       Circulatory    Acute on chronic combined systolic and diastolic congestive heart failure (CMS/HCC)       Genitourinary    Stage 3a chronic kidney disease       Musculoskeletal    Bilateral lower extremity edema       Other    Positive occult stool blood test   Currently on ATB for pneumonia.  Occult stool positive.  Family agreeable to hospice.  Order already written for referral.  Discontinue any further testing.

## 2023-04-27 ENCOUNTER — NURSING HOME VISIT (OUTPATIENT)
Dept: POST ACUTE CARE | Facility: EXTERNAL LOCATION | Age: 88
End: 2023-04-27
Payer: MEDICARE

## 2023-04-27 DIAGNOSIS — I48.21 ATRIAL FIBRILLATION, PERMANENT (MULTI): Primary | ICD-10-CM

## 2023-04-27 DIAGNOSIS — I10 ESSENTIAL HYPERTENSION: ICD-10-CM

## 2023-04-27 PROCEDURE — 99308 SBSQ NF CARE LOW MDM 20: CPT | Performed by: INTERNAL MEDICINE

## 2023-04-27 NOTE — LETTER
Patient: Peterson Argueta  : 1930    Encounter Date: 2023    Pt was seen in the NH,Pt is doing fine , no complaint  General appearance: Comfortable, no distress  ROS: No SOB  Medications reviewed  Head: Normal  Neck: Soft  Heart: Regular  Lungs: Clear  Abdomen: soft    Impression: clinically doing fine, continue current management    Problem List Items Addressed This Visit          Circulatory    Essential hypertension    Atrial fibrillation, permanent (CMS/HCC) - Primary          Electronically Signed By: Eleazar Perkins MD   23 10:35 PM   ambulatory

## 2023-04-28 NOTE — PROGRESS NOTES
Pt was seen in the NH,Pt is doing fine , no complaint  General appearance: Comfortable, no distress  ROS: No SOB  Medications reviewed  Head: Normal  Neck: Soft  Heart: Regular  Lungs: Clear  Abdomen: soft    Impression: clinically doing fine, continue current management    Problem List Items Addressed This Visit          Circulatory    Essential hypertension    Atrial fibrillation, permanent (CMS/HCC) - Primary